# Patient Record
Sex: MALE | Race: WHITE | NOT HISPANIC OR LATINO | Employment: FULL TIME | URBAN - METROPOLITAN AREA
[De-identification: names, ages, dates, MRNs, and addresses within clinical notes are randomized per-mention and may not be internally consistent; named-entity substitution may affect disease eponyms.]

---

## 2024-08-20 ENCOUNTER — APPOINTMENT (OUTPATIENT)
Dept: ORTHOPEDIC SURGERY | Facility: CLINIC | Age: 24
End: 2024-08-20
Payer: COMMERCIAL

## 2024-08-20 ENCOUNTER — HOSPITAL ENCOUNTER (OUTPATIENT)
Dept: RADIOLOGY | Facility: CLINIC | Age: 24
Discharge: HOME | End: 2024-08-20
Payer: COMMERCIAL

## 2024-08-20 ENCOUNTER — OFFICE VISIT (OUTPATIENT)
Dept: ORTHOPEDIC SURGERY | Facility: CLINIC | Age: 24
End: 2024-08-20
Payer: COMMERCIAL

## 2024-08-20 VITALS — HEIGHT: 72 IN | BODY MASS INDEX: 26.41 KG/M2 | WEIGHT: 195 LBS

## 2024-08-20 DIAGNOSIS — M25.511 ACUTE PAIN OF RIGHT SHOULDER: ICD-10-CM

## 2024-08-20 DIAGNOSIS — S43.51XA SPRAIN OF RIGHT ACROMIOCLAVICULAR JOINT, INITIAL ENCOUNTER: Primary | ICD-10-CM

## 2024-08-20 PROCEDURE — L3670 SO ACRO/CLAV CAN WEB PRE OTS: HCPCS | Performed by: INTERNAL MEDICINE

## 2024-08-20 PROCEDURE — 99213 OFFICE O/P EST LOW 20 MIN: CPT | Performed by: INTERNAL MEDICINE

## 2024-08-20 PROCEDURE — 3008F BODY MASS INDEX DOCD: CPT | Performed by: INTERNAL MEDICINE

## 2024-08-20 PROCEDURE — 73030 X-RAY EXAM OF SHOULDER: CPT | Mod: RT

## 2024-08-20 PROCEDURE — 99203 OFFICE O/P NEW LOW 30 MIN: CPT | Performed by: INTERNAL MEDICINE

## 2024-08-20 PROCEDURE — 73030 X-RAY EXAM OF SHOULDER: CPT | Mod: RIGHT SIDE | Performed by: INTERNAL MEDICINE

## 2024-08-20 ASSESSMENT — PATIENT HEALTH QUESTIONNAIRE - PHQ9
SUM OF ALL RESPONSES TO PHQ9 QUESTIONS 1 AND 2: 0
1. LITTLE INTEREST OR PLEASURE IN DOING THINGS: NOT AT ALL
2. FEELING DOWN, DEPRESSED OR HOPELESS: NOT AT ALL

## 2024-08-20 NOTE — PROGRESS NOTES
Acute Injury New Patient Visit    CC: No chief complaint on file.      HPI: Evans is a 24 y.o. male presents today for evaluation for acute right shoulder injury sustained yesterday during a hockey game. He states that he went into the boards with his right shoulder. He stopped playing after the injury. He is here for initial evaluation and x-rays.         Review of Systems   GENERAL: Negative for malaise, significant weight loss, fever  MUSCULOSKELETAL: See HPI  NEURO:  Negative for numbness / tingling     Past Medical History  No past medical history on file.    Medication review  Medication Documentation Review Audit    **Prior to Admission medications have not yet been reviewed**         Allergies  Not on File    Social History  Social History     Socioeconomic History    Marital status: Single     Spouse name: Not on file    Number of children: Not on file    Years of education: Not on file    Highest education level: Not on file   Occupational History    Not on file   Tobacco Use    Smoking status: Not on file    Smokeless tobacco: Not on file   Substance and Sexual Activity    Alcohol use: Not on file    Drug use: Not on file    Sexual activity: Not on file   Other Topics Concern    Not on file   Social History Narrative    Not on file     Social Determinants of Health     Financial Resource Strain: Not on file   Food Insecurity: Not on file   Transportation Needs: Not on file   Physical Activity: Not on file   Stress: Not on file   Social Connections: Not on file   Intimate Partner Violence: Not on file   Housing Stability: Not on file       Surgical History  No past surgical history on file.    Physical Exam:  GENERAL:  Patient is awake, alert, and oriented to person place and time.  Patient appears well nourished and well kept.  Affect Calm, Not Acutely Distressed.  HEENT:  Normocephalic, Atraumatic, EOMI  CARDIOVASCULAR:  Hemodynamically stable.  RESPIRATORY:  Normal respirations with unlabored  breathing.  Extremity: Right shoulder shows skin is intact.  There is no erythema or warmth.  There is no clinical signs of infection.  Mild swelling of the right AC joint.  Can forward flex to 170 degrees with pain.  Abduction to 170 degrees with pain.  External rotation from neutral at 75 degrees.  Internal rotation at the level of L1.  There were no signs of impingement with Richards or Neer's test.  Negative empty can test.  Positive crossarm test.  There is significant pain over the AC joint.  Negative [place]'s test.  Negative sulcus sign.  Negative anterior apprehension's test.  Neurovascularly intact.  No pain over over the right clavicle.      Diagnostics: X-rays reviewed      Procedure: None    Assessment: Right shoulder AC joint sprain    Plan: Evans presents today for initial evaluation for acute right shoulder injury sustained yesterday during a hockey game. X-rays showed no obvious fractures or dislocation.  The history and physical examination consistent with a right AC joint sprain, we recommended sling for comfort, ice and anti-inflammatories. He will return to work tomorrow with limited use of the right arm and lifting no more than 5 pounds till 9/3/24. He will follow-up as needed.  He may call for any work modifications.  Good prognosis.  We discussed recovery time may vary from 1 to 3 weeks.    Orders Placed This Encounter    XR shoulder right 2+ views      At the conclusion of the visit there were no further questions by the patient/family regarding their plan of care.  Patient was instructed to call or return with any issues, questions, or concerns regarding their injury and/or treatment plan described above.     08/20/24 at 1:02 PM - Shellie Castro MD  Scribe Attestation  By signing my name below, I Crow Ladan, [employer]   attest that this documentation has been prepared under the direction and in the presence of Shellie Castro MD.    Office: [phone]    This note was prepared using  voice recognition software.  The details of this note are correct and have been reviewed, and corrected to the best of my ability.  Some grammatical errors may persist related to the Dragon software.

## 2024-08-20 NOTE — LETTER
August 20, 2024     Patient: Evans Boss   YOB: 2000   Date of Visit: 8/20/2024       To Whom It May Concern:    Evans Boss was seen in my clinic on 8/20/2024 at 1:15 pm. Please excuse Evans for his absence from work on this day to make the appointment. Please allow him to return to work on 8/21/24 with the following restrictions:     No lifting greater than 5 lbs with right arm, limited use of right upper extremity    Restrictions remain for until 9/3/24.    If you have any questions or concerns, please don't hesitate to call.         Sincerely,         Shellie Castro MD

## 2024-08-26 ENCOUNTER — TELEPHONE (OUTPATIENT)
Dept: ORTHOPEDIC SURGERY | Facility: CLINIC | Age: 24
End: 2024-08-26
Payer: COMMERCIAL

## 2024-08-27 NOTE — TELEPHONE ENCOUNTER
Patient's mother called states they would not release patient to work and will need a note stating restrictions such as light duty.    Patient can be reached at [phone]